# Patient Record
Sex: MALE | Race: BLACK OR AFRICAN AMERICAN | NOT HISPANIC OR LATINO | Employment: UNEMPLOYED | URBAN - METROPOLITAN AREA
[De-identification: names, ages, dates, MRNs, and addresses within clinical notes are randomized per-mention and may not be internally consistent; named-entity substitution may affect disease eponyms.]

---

## 2023-09-24 PROBLEM — R79.89 ELEVATED SERUM CREATININE: Status: ACTIVE | Noted: 2023-09-24

## 2023-09-24 PROBLEM — E87.20 METABOLIC ACIDOSIS: Status: ACTIVE | Noted: 2023-09-24

## 2023-09-24 PROBLEM — R11.2 INTRACTABLE NAUSEA AND VOMITING: Status: ACTIVE | Noted: 2023-09-24

## 2023-09-24 PROBLEM — E87.1 HYPONATREMIA: Status: ACTIVE | Noted: 2023-09-24

## 2023-09-24 PROBLEM — E10.65 TYPE 1 DIABETES MELLITUS WITH HYPERGLYCEMIA: Status: ACTIVE | Noted: 2023-09-24

## 2023-09-24 PROBLEM — N18.4 CKD (CHRONIC KIDNEY DISEASE), STAGE IV: Status: ACTIVE | Noted: 2023-09-24

## 2023-09-25 PROBLEM — E10.21 DIABETIC NEPHROPATHY ASSOCIATED WITH TYPE 1 DIABETES MELLITUS: Status: ACTIVE | Noted: 2023-09-25

## 2023-09-25 PROBLEM — N17.9 ACUTE KIDNEY INJURY SUPERIMPOSED ON CHRONIC KIDNEY DISEASE: Status: ACTIVE | Noted: 2023-09-25

## 2023-09-25 PROBLEM — E87.22 CHRONIC METABOLIC ACIDOSIS: Status: ACTIVE | Noted: 2023-09-24

## 2023-09-25 PROBLEM — N18.9 ACUTE KIDNEY INJURY SUPERIMPOSED ON CHRONIC KIDNEY DISEASE: Status: ACTIVE | Noted: 2023-09-25

## 2023-09-25 PROBLEM — E86.0 DEHYDRATION: Status: ACTIVE | Noted: 2023-09-25

## 2023-09-25 PROBLEM — R80.9 PROTEINURIA: Status: ACTIVE | Noted: 2023-09-25

## 2023-09-29 ENCOUNTER — PATIENT OUTREACH (OUTPATIENT)
Dept: ADMINISTRATIVE | Facility: CLINIC | Age: 33
End: 2023-09-29

## 2023-09-29 NOTE — PROGRESS NOTES
C3 nurse spoke with Apollo Nixon  for a TCC post hospital discharge follow up call. The patient has a scheduled [facility] appointment with Conchita Bartlett on 10/2/23 @ 2238.

## 2023-12-16 PROBLEM — D63.1 ANEMIA IN STAGE 4 CHRONIC KIDNEY DISEASE: Status: ACTIVE | Noted: 2023-09-24

## 2023-12-16 PROBLEM — I10 PRIMARY HYPERTENSION: Status: ACTIVE | Noted: 2023-12-16

## 2023-12-16 PROBLEM — E83.39 HYPERPHOSPHATEMIA: Status: ACTIVE | Noted: 2023-12-16

## 2023-12-16 PROBLEM — D50.9 IRON DEFICIENCY ANEMIA: Status: ACTIVE | Noted: 2023-12-16

## 2023-12-16 PROBLEM — E87.5 HYPERKALEMIA: Status: ACTIVE | Noted: 2023-12-16

## 2023-12-16 PROBLEM — N25.81 SECONDARY HYPERPARATHYROIDISM OF RENAL ORIGIN: Status: ACTIVE | Noted: 2023-12-16

## 2023-12-16 PROBLEM — E78.00 PURE HYPERCHOLESTEROLEMIA: Status: ACTIVE | Noted: 2023-12-16

## 2023-12-16 PROBLEM — E55.9 VITAMIN D DEFICIENCY, UNSPECIFIED: Status: ACTIVE | Noted: 2023-12-16

## 2023-12-25 PROBLEM — N18.9 ACUTE KIDNEY INJURY SUPERIMPOSED ON CHRONIC KIDNEY DISEASE: Status: RESOLVED | Noted: 2023-09-25 | Resolved: 2023-12-25

## 2023-12-25 PROBLEM — N17.9 ACUTE KIDNEY INJURY SUPERIMPOSED ON CHRONIC KIDNEY DISEASE: Status: RESOLVED | Noted: 2023-09-25 | Resolved: 2023-12-25

## 2024-01-25 ENCOUNTER — TELEPHONE (OUTPATIENT)
Dept: TRANSPLANT | Facility: CLINIC | Age: 34
End: 2024-01-25

## 2024-01-31 ENCOUNTER — TELEPHONE (OUTPATIENT)
Dept: TRANSPLANT | Facility: CLINIC | Age: 34
End: 2024-01-31

## 2024-02-01 PROBLEM — N18.9 CHRONIC KIDNEY DISEASE: Status: ACTIVE | Noted: 2024-02-01

## 2024-02-01 PROBLEM — U07.1 COVID-19: Status: ACTIVE | Noted: 2024-02-01

## 2024-02-02 PROBLEM — E87.1 HYPONATREMIA: Status: RESOLVED | Noted: 2023-09-24 | Resolved: 2024-02-02

## 2024-02-03 DIAGNOSIS — U07.1 COVID-19 VIRUS DETECTED: ICD-10-CM

## 2024-03-03 PROBLEM — N18.4 STAGE 4 CHRONIC KIDNEY DISEASE: Status: ACTIVE | Noted: 2024-02-01

## 2024-03-13 ENCOUNTER — TELEPHONE (OUTPATIENT)
Dept: TRANSPLANT | Facility: CLINIC | Age: 34
End: 2024-03-13

## 2024-03-30 PROBLEM — R73.9 HYPERGLYCEMIA: Status: ACTIVE | Noted: 2024-03-30

## 2024-03-30 PROBLEM — R11.10 VOMITING: Status: ACTIVE | Noted: 2024-03-30

## 2024-03-30 PROBLEM — R79.89 ELEVATED SERUM CREATININE: Status: RESOLVED | Noted: 2023-09-24 | Resolved: 2024-03-30

## 2024-04-01 PROBLEM — R11.10 VOMITING: Status: RESOLVED | Noted: 2024-03-30 | Resolved: 2024-04-01

## 2024-04-01 PROBLEM — N18.9 ACUTE KIDNEY INJURY SUPERIMPOSED ON CHRONIC KIDNEY DISEASE: Status: RESOLVED | Noted: 2023-09-25 | Resolved: 2024-04-01

## 2024-04-01 PROBLEM — R11.2 NAUSEA AND VOMITING: Status: RESOLVED | Noted: 2023-09-24 | Resolved: 2024-04-01

## 2024-04-01 PROBLEM — N17.9 ACUTE KIDNEY INJURY SUPERIMPOSED ON CHRONIC KIDNEY DISEASE: Status: RESOLVED | Noted: 2023-09-25 | Resolved: 2024-04-01

## 2024-04-04 PROBLEM — N18.5 STAGE 5 CHRONIC KIDNEY DISEASE NOT ON CHRONIC DIALYSIS: Status: ACTIVE | Noted: 2024-02-01

## 2024-04-04 PROBLEM — N18.5 ANEMIA IN STAGE 5 CHRONIC KIDNEY DISEASE, NOT ON CHRONIC DIALYSIS: Status: ACTIVE | Noted: 2023-09-24

## 2024-08-10 PROBLEM — J06.9 UPPER RESPIRATORY INFECTION: Status: ACTIVE | Noted: 2024-08-10

## 2024-08-10 PROBLEM — I10 UNCONTROLLED HYPERTENSION: Status: ACTIVE | Noted: 2024-08-10

## 2024-08-19 PROBLEM — K52.9 GASTROENTERITIS: Status: ACTIVE | Noted: 2024-08-19

## 2024-09-19 PROBLEM — N18.6 ANEMIA IN ESRD (END-STAGE RENAL DISEASE): Status: ACTIVE | Noted: 2023-09-24

## 2024-09-19 PROBLEM — N18.6 ESRD (END STAGE RENAL DISEASE): Status: ACTIVE | Noted: 2024-02-01

## 2024-10-14 PROBLEM — N18.6 TYPE 1 DM WITH END-STAGE RENAL DISEASE: Status: ACTIVE | Noted: 2024-10-14

## 2024-10-14 PROBLEM — E10.22 TYPE 1 DM WITH END-STAGE RENAL DISEASE: Status: ACTIVE | Noted: 2024-10-14

## 2025-02-02 PROBLEM — N18.5 CKD (CHRONIC KIDNEY DISEASE), SYMPTOM MANAGEMENT ONLY, STAGE 5: Status: ACTIVE | Noted: 2025-02-02

## 2025-03-06 PROBLEM — N04.9 ANASARCA ASSOCIATED WITH DISORDER OF KIDNEY: Status: ACTIVE | Noted: 2025-03-06

## 2025-03-10 ENCOUNTER — OUTPATIENT CASE MANAGEMENT (OUTPATIENT)
Dept: ADMINISTRATIVE | Facility: OTHER | Age: 35
End: 2025-03-10

## 2025-03-11 NOTE — PROGRESS NOTES
Outpatient Care Management  Patient Not Qualified    Patient: Apollo Nixon  MRN:  [id number]  Date of Service:  3/11/2025  Completed by:  Ira May LMSW    Chief Complaint   Patient presents with    Case Closure       Patient Summary           Reason Not Qualified:  Needs met by others    03/10/2025 - Consult received to discuss assistance with starting admission referral to [facility]. [facility] contacted pt at  [phone]. No answer, left a VM with SW contact info [phone]. [facility] contacted [facility] admissions at [phone] spoke with Radha Lake and started the admission process to [facility] at [address] [place] [address] phone # [phone]. [facility]AL[facility] sent clinical notes 03/10/2025, photo of insurance, allergies list, medication list, Hepatitis B panel results, referral 03/06/2025,Discharge notes 10/15/2024, Ed notes 11/23/2024, nephrology clinical notes 02/06/2025. Radha received clinical notes, pt will be starting dialysis Thursday, March 13,2025 at 11:30 am. Pt will have a Tuesday, Thursday, and Saturday schedule.     03/11/2025 - [facility] contacted pt at [phone]. TALIB informed pt that everything was sent to [facility], he will start on Thursday, march 13,2025 at 11:30 am. TALIB sent Dialysis schedule letter to pt email [email or web address]. TALIB informed pt that [facility] will contact pt, if he had any question he could contact them at [phone]. Pt voiced he understood, no further needs. TALIB provided contact info [phone]. [facility] will remain available as needed.     TALIB update Dr. Chio Anderson via epic chat.        Ira May LMSW  [phone]  
Stable

## 2025-07-24 PROBLEM — H54.60 MONOCULAR VISION LOSS: Status: ACTIVE | Noted: 2025-07-24

## 2025-07-25 ENCOUNTER — PATIENT OUTREACH (OUTPATIENT)
Dept: ADMINISTRATIVE | Facility: HOSPITAL | Age: 35
End: 2025-07-25

## 2025-07-25 DIAGNOSIS — E10.65 TYPE 1 DIABETES MELLITUS WITH HYPERGLYCEMIA: Primary | ICD-10-CM
